# Patient Record
Sex: FEMALE | Race: WHITE | Employment: UNEMPLOYED | URBAN - METROPOLITAN AREA
[De-identification: names, ages, dates, MRNs, and addresses within clinical notes are randomized per-mention and may not be internally consistent; named-entity substitution may affect disease eponyms.]

---

## 2021-09-27 ENCOUNTER — TRANSCRIBE ORDER (OUTPATIENT)
Dept: SCHEDULING | Age: 36
End: 2021-09-27

## 2021-09-27 DIAGNOSIS — H53.9 UNSPECIFIED VISUAL DISTURBANCE: ICD-10-CM

## 2021-09-27 DIAGNOSIS — R47.01 APHASIA: ICD-10-CM

## 2021-09-27 DIAGNOSIS — R51.9 HA (HEADACHE): Primary | ICD-10-CM

## 2021-10-11 ENCOUNTER — TRANSCRIBE ORDER (OUTPATIENT)
Dept: SCHEDULING | Age: 36
End: 2021-10-11

## 2021-10-11 DIAGNOSIS — R00.2 PALPITATIONS: Primary | ICD-10-CM

## 2021-10-12 ENCOUNTER — HOSPITAL ENCOUNTER (OUTPATIENT)
Dept: CT IMAGING | Age: 36
Discharge: HOME | End: 2021-10-12
Payer: MEDICAID

## 2021-10-12 DIAGNOSIS — H53.9 UNSPECIFIED VISUAL DISTURBANCE: ICD-10-CM

## 2021-10-12 DIAGNOSIS — R47.01 APHASIA: ICD-10-CM

## 2021-10-12 DIAGNOSIS — R51.9 HA (HEADACHE): ICD-10-CM

## 2021-10-12 PROCEDURE — 70450 CT HEAD/BRAIN W/O DYE: CPT

## 2021-10-22 ENCOUNTER — HOSPITAL ENCOUNTER (OUTPATIENT)
Dept: VASCULAR SURGERY | Age: 36
Discharge: HOME | End: 2021-10-22
Attending: NURSE PRACTITIONER
Payer: MEDICAID

## 2021-10-22 DIAGNOSIS — R00.2 PALPITATIONS: ICD-10-CM

## 2021-10-22 LAB
ECHO AO ROOT DIAM: 2.58 CM
ECHO AV MEAN GRADIENT: 2.11 MMHG
ECHO AV MEAN VELOCITY: 67.82 CM/S
ECHO AV PEAK GRADIENT: 4.09 MMHG
ECHO AV PEAK VELOCITY: 101.08 CM/S
ECHO AV VTI: 21.56 CM
ECHO LA VOL 2C: 13.94 ML (ref 22–52)
ECHO LA VOL 4C: 21.48 ML (ref 22–52)
ECHO LA VOL BP: 19.45 ML (ref 22–52)
ECHO LA VOL BP: 19.45 ML (ref 22–52)
ECHO LV EDV A2C: 77.32 ML
ECHO LV EDV A2C: 77.32 ML
ECHO LV EDV BP: 35.08 ML (ref 56–104)
ECHO LV EDV BP: 35.08 ML (ref 56–104)
ECHO LV EJECTION FRACTION A2C: 63 PERCENT
ECHO LV EJECTION FRACTION A2C: 63 PERCENT
ECHO LV EJECTION FRACTION A4C: 59 PERCENT
ECHO LV EJECTION FRACTION A4C: 59 PERCENT
ECHO LV EJECTION FRACTION BIPLANE: 56.7 PERCENT (ref 55–100)
ECHO LV EJECTION FRACTION BIPLANE: 56.7 PERCENT (ref 55–100)
ECHO LV ESV A2C: 12.05 ML
ECHO LV ESV BP: 15.18 ML (ref 19–49)
ECHO LV ESV BP: 15.18 ML (ref 19–49)
ECHO LV INTERNAL DIMENSION DIASTOLIC: 4.17 CM (ref 3.9–5.3)
ECHO LV INTERNAL DIMENSION SYSTOLIC: 2.82 CM
ECHO LV IVSD: 0.67 CM (ref 0.6–0.9)
ECHO LV MASS 2D: 87.2 G (ref 67–162)
ECHO LV POSTERIOR WALL DIASTOLIC: 0.77 CM (ref 0.6–0.9)
ECHO LVOT PEAK GRADIENT: 2.78 MMHG
ECHO LVOT PEAK VELOCITY: 83.43 CM/S
ECHO LVOT SV: 50.1 ML
ECHO LVOT SV: 50.1 ML
ECHO LVOT VTI: 15.98 CM
ECHO MV A VELOCITY: 70.75 CM/S
ECHO MV AREA PHT: 3.26 CM2
ECHO MV AREA PHT: 3.26 CM2
ECHO MV E DECELERATION TIME (DT): 232.67 MS
ECHO MV E VELOCITY: 104.5 CM/S
ECHO MV E/A RATIO: 1.48
ECHO MV PRESSURE HALF TIME (PHT): 67.47 MS
LVOT MG: 1.28 MMHG

## 2021-10-22 PROCEDURE — 96374 THER/PROPH/DIAG INJ IV PUSH: CPT

## 2023-05-21 RX ORDER — CYCLOBENZAPRINE HCL 10 MG
10 TABLET ORAL 3 TIMES DAILY PRN
COMMUNITY
Start: 2014-09-16

## 2023-05-21 RX ORDER — NAPROXEN 375 MG/1
375 TABLET ORAL 2 TIMES DAILY WITH MEALS
COMMUNITY
Start: 2014-09-16

## 2024-07-29 ENCOUNTER — HOSPITAL ENCOUNTER (EMERGENCY)
Facility: HOSPITAL | Age: 39
Discharge: HOME | End: 2024-07-29
Attending: EMERGENCY MEDICINE
Payer: MEDICAID

## 2024-07-29 VITALS
TEMPERATURE: 97.4 F | HEIGHT: 61 IN | DIASTOLIC BLOOD PRESSURE: 78 MMHG | RESPIRATION RATE: 18 BRPM | OXYGEN SATURATION: 97 % | WEIGHT: 158 LBS | HEART RATE: 99 BPM | BODY MASS INDEX: 29.83 KG/M2 | SYSTOLIC BLOOD PRESSURE: 127 MMHG

## 2024-07-29 DIAGNOSIS — U07.1 COVID-19: Primary | ICD-10-CM

## 2024-07-29 LAB
FLUAV RNA SPEC QL NAA+PROBE: NOT DETECTED
FLUBV RNA SPEC QL NAA+PROBE: NOT DETECTED
SARS-COV-2 RNA RESP QL NAA+PROBE: DETECTED

## 2024-07-29 PROCEDURE — 99283 EMERGENCY DEPT VISIT LOW MDM: CPT

## 2024-07-29 PROCEDURE — 87636 SARSCOV2 & INF A&B AMP PRB: CPT

## 2024-07-29 RX ORDER — METOPROLOL SUCCINATE 25 MG/1
25 TABLET, EXTENDED RELEASE ORAL DAILY
COMMUNITY
Start: 2023-07-22

## 2024-07-29 RX ORDER — BUPRENORPHINE AND NALOXONE 8; 2 MG/1; MG/1
FILM, SOLUBLE BUCCAL; SUBLINGUAL
COMMUNITY
Start: 2023-09-12

## 2024-07-29 RX ORDER — SERTRALINE HYDROCHLORIDE 25 MG/1
25 TABLET, FILM COATED ORAL EVERY MORNING
COMMUNITY
Start: 2023-09-19

## 2024-07-29 RX ORDER — LAMOTRIGINE 25 MG/1
25 TABLET ORAL DAILY
COMMUNITY
Start: 2023-09-19

## 2024-07-29 RX ORDER — HYDROXYZINE PAMOATE 50 MG/1
50 CAPSULE ORAL 4 TIMES DAILY PRN
COMMUNITY
Start: 2023-07-18

## 2024-07-29 ASSESSMENT — PAIN - FUNCTIONAL ASSESSMENT
PAIN_FUNCTIONAL_ASSESSMENT: ACTIVITIES ARE NOT PREVENTED
PAIN_FUNCTIONAL_ASSESSMENT: 0-10

## 2024-07-29 ASSESSMENT — PAIN DESCRIPTION - LOCATION: LOCATION: GENERALIZED;HEAD

## 2024-07-29 ASSESSMENT — PAIN SCALES - GENERAL: PAINLEVEL_OUTOF10: 5

## 2024-07-29 NOTE — ED PROVIDER NOTES
[facility] EMERGENCY DEPT  EMERGENCY DEPARTMENT ENCOUNTER      Pt Name: Kayla Raymundo  MRN: [id number]  Birthdate 1985  Date of evaluation: 7/29/2024  Provider: Pierre Pimentel MD    CHIEF COMPLAINT       Chief Complaint   Patient presents with    Concern For COVID-19         HISTORY OF PRESENT ILLNESS   (Location/Symptom, Timing/Onset, Context/Setting, Quality, Duration, Modifying Factors, Severity)  Note limiting factors.   Kayla Raymundo is a 39 y.o. female who presents to the emergency department suspicion of COVID.  Family member at home with COVID patient is noted generalized symptoms of illness malaise minimal dry cough congestion.  No dyspnea no GI or  symptoms no underlying medical problems    HPI    Nursing Notes were reviewed.    REVIEW OF SYSTEMS    (2-9 systems for level 4, 10 or more for level 5)     Review of Systems   All other systems reviewed and are negative.      Except as noted above the remainder of the review of systems was reviewed and negative.       PAST MEDICAL HISTORY   History reviewed. No pertinent past medical history.      SURGICAL HISTORY     History reviewed. No pertinent surgical history.      CURRENT MEDICATIONS       Previous Medications    BUPRENORPHINE-NALOXONE (SUBOXONE) 8-2 MG FILM SL FILM    TAKE 1.5 VALARIE ONCE A DAY    CYCLOBENZAPRINE (FLEXERIL) 10 MG TABLET    Take 1 tablet by mouth 3 times daily as needed    HYDROXYZINE PAMOATE (VISTARIL) 50 MG CAPSULE    Take 1 capsule by mouth 4 times daily as needed    LAMOTRIGINE (LAMICTAL) 25 MG TABLET    Take 1 tablet by mouth daily    METOPROLOL SUCCINATE (TOPROL XL) 25 MG EXTENDED RELEASE TABLET    Take 1 tablet by mouth daily    NAPROXEN (NAPROSYN) 375 MG TABLET    Take 1 tablet by mouth 2 times daily (with meals)    SERTRALINE (ZOLOFT) 25 MG TABLET    Take 1 tablet by mouth every morning       ALLERGIES     Patient has no known allergies.    FAMILY HISTORY     History reviewed. No pertinent family history.       SOCIAL HISTORY

## 2025-04-04 ENCOUNTER — HOSPITAL ENCOUNTER (EMERGENCY)
Facility: HOSPITAL | Age: 40
Discharge: HOME | End: 2025-04-05
Payer: MEDICAID

## 2025-04-04 DIAGNOSIS — T50.904A OVERDOSE OF UNDETERMINED INTENT, INITIAL ENCOUNTER: Primary | ICD-10-CM

## 2025-04-04 LAB
ALBUMIN SERPL-MCNC: 3.9 G/DL (ref 3.5–5)
ALBUMIN/GLOB SERPL: 1.1 (ref 1.1–2.2)
ALP SERPL-CCNC: 77 U/L (ref 45–117)
ALT SERPL-CCNC: 37 U/L (ref 12–78)
AMPHET UR QL SCN: NEGATIVE
ANION GAP SERPL CALC-SCNC: 10 MMOL/L (ref 2–12)
APAP SERPL-MCNC: <2 UG/ML (ref 10–30)
APPEARANCE UR: CLEAR
AST SERPL W P-5'-P-CCNC: 31 U/L (ref 15–37)
BACTERIA URNS QL MICRO: ABNORMAL /HPF
BARBITURATES UR QL SCN: NEGATIVE
BASOPHILS # BLD: 0.05 K/UL (ref 0–0.1)
BASOPHILS NFR BLD: 0.4 % (ref 0–1)
BENZODIAZ UR QL: NEGATIVE
BILIRUB SERPL-MCNC: 0.6 MG/DL (ref 0.2–1)
BILIRUB UR QL: NEGATIVE
BUN SERPL-MCNC: 7 MG/DL (ref 6–20)
BUN/CREAT SERPL: 8 (ref 12–20)
CA-I BLD-MCNC: 9.3 MG/DL (ref 8.5–10.1)
CANNABINOIDS UR QL SCN: NEGATIVE
CHLORIDE SERPL-SCNC: 102 MMOL/L (ref 97–108)
CK SERPL-CCNC: 101 U/L (ref 26–192)
CO2 SERPL-SCNC: 26 MMOL/L (ref 21–32)
COCAINE UR QL SCN: NEGATIVE
COLOR UR: ABNORMAL
CREAT SERPL-MCNC: 0.85 MG/DL (ref 0.55–1.02)
DATE LAST DOSE: ABNORMAL
DATE LAST DOSE: NORMAL
DIFFERENTIAL METHOD BLD: ABNORMAL
DOSE AMOUNT: ABNORMAL UNITS
DOSE AMOUNT: NORMAL UNITS
EKG ATRIAL RATE: 85 BPM
EKG DIAGNOSIS: NORMAL
EKG P AXIS: 30 DEGREES
EKG P-R INTERVAL: 143 MS
EKG Q-T INTERVAL: 433 MS
EKG QRS DURATION: 99 MS
EKG QTC CALCULATION (BAZETT): 515 MS
EKG R AXIS: 10 DEGREES
EKG T AXIS: 43 DEGREES
EKG VENTRICULAR RATE: 85 BPM
EOSINOPHIL # BLD: 0.04 K/UL (ref 0–0.4)
EOSINOPHIL NFR BLD: 0.3 % (ref 0–7)
ERYTHROCYTE [DISTWIDTH] IN BLOOD BY AUTOMATED COUNT: 12.7 % (ref 11.5–14.5)
ETHANOL SERPL-MCNC: <10 MG/DL (ref 0–0.08)
GLOBULIN SER CALC-MCNC: 3.4 G/DL (ref 2–4)
GLUCOSE SERPL-MCNC: 116 MG/DL (ref 65–100)
GLUCOSE UR STRIP.AUTO-MCNC: NEGATIVE MG/DL
HCG UR QL: NEGATIVE
HCT VFR BLD AUTO: 42.4 % (ref 35–47)
HGB BLD-MCNC: 14.9 G/DL (ref 11.5–16)
HGB UR QL STRIP: ABNORMAL
IMM GRANULOCYTES # BLD AUTO: 0.06 K/UL (ref 0–0.04)
IMM GRANULOCYTES NFR BLD AUTO: 0.5 % (ref 0–0.5)
KETONES UR QL STRIP.AUTO: 15 MG/DL
LEUKOCYTE ESTERASE UR QL STRIP.AUTO: NEGATIVE
LYMPHOCYTES # BLD: 1.65 K/UL (ref 0.8–3.5)
LYMPHOCYTES NFR BLD: 13.1 % (ref 12–49)
Lab: NORMAL
MAGNESIUM SERPL-MCNC: 2 MG/DL (ref 1.6–2.4)
MCH RBC QN AUTO: 31.9 PG (ref 26–34)
MCHC RBC AUTO-ENTMCNC: 35.1 G/DL (ref 30–36.5)
MCV RBC AUTO: 90.8 FL (ref 80–99)
MDMA, URINE: NEGATIVE
METHADONE UR QL: NEGATIVE
MONOCYTES # BLD: 0.86 K/UL (ref 0–1)
MONOCYTES NFR BLD: 6.8 % (ref 5–13)
NEUTS SEG # BLD: 9.91 K/UL (ref 1.8–8)
NEUTS SEG NFR BLD: 78.9 % (ref 32–75)
NITRITE UR QL STRIP.AUTO: NEGATIVE
NRBC # BLD: 0 K/UL (ref 0–0.01)
NRBC BLD-RTO: 0 PER 100 WBC
OPIATES UR QL: NEGATIVE
PCP UR QL: NEGATIVE
PH UR STRIP: 6 (ref 5–8)
PLATELET # BLD AUTO: 372 K/UL (ref 150–400)
PMV BLD AUTO: 9.6 FL (ref 8.9–12.9)
POTASSIUM SERPL-SCNC: 3.6 MMOL/L (ref 3.5–5.1)
PROT SERPL-MCNC: 7.3 G/DL (ref 6.4–8.2)
PROT UR STRIP-MCNC: NEGATIVE MG/DL
RBC # BLD AUTO: 4.67 M/UL (ref 3.8–5.2)
RBC #/AREA URNS HPF: ABNORMAL /HPF (ref 0–3)
SALICYLATES SERPL-MCNC: 4.4 MG/DL (ref 2.8–20)
SODIUM SERPL-SCNC: 138 MMOL/L (ref 136–145)
SP GR UR REFRACTOMETRY: 1.01 (ref 1–1.03)
URINE CULTURE IF INDICATED: ABNORMAL
UROBILINOGEN UR QL STRIP.AUTO: 0.2 EU/DL (ref 0.2–1)
WBC # BLD AUTO: 12.6 K/UL (ref 3.6–11)
WBC URNS QL MICRO: ABNORMAL /HPF (ref 0–5)

## 2025-04-04 PROCEDURE — 81001 URINALYSIS AUTO W/SCOPE: CPT

## 2025-04-04 PROCEDURE — 96365 THER/PROPH/DIAG IV INF INIT: CPT

## 2025-04-04 PROCEDURE — 85025 COMPLETE CBC W/AUTO DIFF WBC: CPT

## 2025-04-04 PROCEDURE — 80179 DRUG ASSAY SALICYLATE: CPT

## 2025-04-04 PROCEDURE — 80143 DRUG ASSAY ACETAMINOPHEN: CPT

## 2025-04-04 PROCEDURE — 96368 THER/DIAG CONCURRENT INF: CPT

## 2025-04-04 PROCEDURE — 36415 COLL VENOUS BLD VENIPUNCTURE: CPT

## 2025-04-04 PROCEDURE — 99284 EMERGENCY DEPT VISIT MOD MDM: CPT

## 2025-04-04 PROCEDURE — 83735 ASSAY OF MAGNESIUM: CPT

## 2025-04-04 PROCEDURE — 80307 DRUG TEST PRSMV CHEM ANLYZR: CPT

## 2025-04-04 PROCEDURE — 80053 COMPREHEN METABOLIC PANEL: CPT

## 2025-04-04 PROCEDURE — 96375 TX/PRO/DX INJ NEW DRUG ADDON: CPT

## 2025-04-04 PROCEDURE — 6360000002 HC RX W HCPCS

## 2025-04-04 PROCEDURE — 96366 THER/PROPH/DIAG IV INF ADDON: CPT

## 2025-04-04 PROCEDURE — 82550 ASSAY OF CK (CPK): CPT

## 2025-04-04 PROCEDURE — 6370000000 HC RX 637 (ALT 250 FOR IP)

## 2025-04-04 PROCEDURE — 81025 URINE PREGNANCY TEST: CPT

## 2025-04-04 PROCEDURE — 2580000003 HC RX 258

## 2025-04-04 PROCEDURE — 82077 ASSAY SPEC XCP UR&BREATH IA: CPT

## 2025-04-04 RX ORDER — ACETAMINOPHEN 325 MG/1
650 TABLET ORAL
Status: COMPLETED | OUTPATIENT
Start: 2025-04-04 | End: 2025-04-04

## 2025-04-04 RX ORDER — MAGNESIUM SULFATE 1 G/100ML
1000 INJECTION INTRAVENOUS
Status: COMPLETED | OUTPATIENT
Start: 2025-04-04 | End: 2025-04-05

## 2025-04-04 RX ORDER — CALCIUM GLUCONATE 20 MG/ML
1000 INJECTION, SOLUTION INTRAVENOUS
Status: COMPLETED | OUTPATIENT
Start: 2025-04-04 | End: 2025-04-05

## 2025-04-04 RX ORDER — LORAZEPAM 2 MG/ML
1 INJECTION INTRAMUSCULAR ONCE
Status: COMPLETED | OUTPATIENT
Start: 2025-04-04 | End: 2025-04-04

## 2025-04-04 RX ORDER — 0.9 % SODIUM CHLORIDE 0.9 %
1000 INTRAVENOUS SOLUTION INTRAVENOUS ONCE
Status: COMPLETED | OUTPATIENT
Start: 2025-04-04 | End: 2025-04-04

## 2025-04-04 RX ADMIN — POTASSIUM BICARBONATE 20 MEQ: 782 TABLET, EFFERVESCENT ORAL at 23:23

## 2025-04-04 RX ADMIN — CALCIUM GLUCONATE 1000 MG: 20 INJECTION, SOLUTION INTRAVENOUS at 23:39

## 2025-04-04 RX ADMIN — SODIUM CHLORIDE 1000 ML: 0.9 INJECTION, SOLUTION INTRAVENOUS at 21:23

## 2025-04-04 RX ADMIN — LORAZEPAM 1 MG: 2 INJECTION INTRAMUSCULAR; INTRAVENOUS at 21:22

## 2025-04-04 RX ADMIN — MAGNESIUM SULFATE HEPTAHYDRATE 1000 MG: 1 INJECTION, SOLUTION INTRAVENOUS at 23:33

## 2025-04-04 RX ADMIN — ACETAMINOPHEN 650 MG: 325 TABLET ORAL at 23:23

## 2025-04-04 ASSESSMENT — PAIN - FUNCTIONAL ASSESSMENT: PAIN_FUNCTIONAL_ASSESSMENT: NONE - DENIES PAIN

## 2025-04-04 ASSESSMENT — LIFESTYLE VARIABLES
HOW OFTEN DO YOU HAVE A DRINK CONTAINING ALCOHOL: MONTHLY OR LESS
HOW MANY STANDARD DRINKS CONTAINING ALCOHOL DO YOU HAVE ON A TYPICAL DAY: 1 OR 2

## 2025-04-05 VITALS
DIASTOLIC BLOOD PRESSURE: 70 MMHG | WEIGHT: 159 LBS | RESPIRATION RATE: 21 BRPM | TEMPERATURE: 98.7 F | BODY MASS INDEX: 30.02 KG/M2 | HEIGHT: 61 IN | HEART RATE: 72 BPM | SYSTOLIC BLOOD PRESSURE: 138 MMHG | OXYGEN SATURATION: 97 %

## 2025-04-05 LAB
DATE LAST DOSE: NORMAL
DOSE AMOUNT: NORMAL UNITS
EKG ATRIAL RATE: 70 BPM
EKG DIAGNOSIS: NORMAL
EKG P AXIS: 43 DEGREES
EKG P-R INTERVAL: 144 MS
EKG Q-T INTERVAL: 446 MS
EKG QRS DURATION: 97 MS
EKG QTC CALCULATION (BAZETT): 482 MS
EKG R AXIS: 18 DEGREES
EKG T AXIS: 50 DEGREES
EKG VENTRICULAR RATE: 70 BPM
SALICYLATES SERPL-MCNC: 3.4 MG/DL (ref 2.8–20)

## 2025-04-05 PROCEDURE — 93005 ELECTROCARDIOGRAM TRACING: CPT

## 2025-04-05 NOTE — ED NOTES
Update to Poison Control.  Recommendation is monitor 8 hours after ingestion, recheck ASA level, make sure Mg, Ca, and K level on high side of normal, and get Qtc normal.

## 2025-04-05 NOTE — DISCHARGE INSTRUCTIONS
Thank you for choosing our Emergency Department for your care.  It is our privilege to care for you in your time of need.  In the next several days, you may receive a survey via email or mailed to your home about your experience with our team.  We would greatly appreciate you taking a few minutes to complete the survey, as we use this information to learn what we have done well and what we could be doing better. Thank you for trusting us with your care!    Below you will find a list of your tests from today's visit.   Labs and Radiology Studies  Recent Results (from the past 12 hours)   EKG 12 Lead    Collection Time: 04/04/25  9:02 PM   Result Value Ref Range    Ventricular Rate 85 BPM    Atrial Rate 85 BPM    P-R Interval 143 ms    QRS Duration 99 ms    Q-T Interval 433 ms    QTc Calculation (Bazett) 515 ms    P Axis 30 degrees    R Axis 10 degrees    T Axis 43 degrees    Diagnosis       Sinus rhythm  RSR' in V1 or V2, right VCD or RVH  Prolonged QT interval     CBC with Auto Differential    Collection Time: 04/04/25  9:15 PM   Result Value Ref Range    WBC 12.6 (H) 3.6 - 11.0 K/uL    RBC 4.67 3.80 - 5.20 M/uL    Hemoglobin 14.9 11.5 - 16.0 g/dL    Hematocrit 42.4 35.0 - 47.0 %    MCV 90.8 80.0 - 99.0 FL    MCH 31.9 26.0 - 34.0 PG    MCHC 35.1 30.0 - 36.5 g/dL    RDW 12.7 11.5 - 14.5 %    Platelets 372 150 - 400 K/uL    MPV 9.6 8.9 - 12.9 FL    Nucleated RBCs 0.0 0.0  WBC    nRBC 0.00 0.00 - 0.01 K/uL    Neutrophils % 78.9 (H) 32.0 - 75.0 %    Lymphocytes % 13.1 12.0 - 49.0 %    Monocytes % 6.8 5.0 - 13.0 %    Eosinophils % 0.3 0.0 - 7.0 %    Basophils % 0.4 0.0 - 1.0 %    Immature Granulocytes % 0.5 0 - 0.5 %    Neutrophils Absolute 9.91 (H) 1.80 - 8.00 K/UL    Lymphocytes Absolute 1.65 0.80 - 3.50 K/UL    Monocytes Absolute 0.86 0.00 - 1.00 K/UL    Eosinophils Absolute 0.04 0.00 - 0.40 K/UL    Basophils Absolute 0.05 0.00 - 0.10 K/UL    Immature Granulocytes Absolute 0.06 (H) 0.00 - 0.04 K/UL     when you go to your follow-up appointment.     If you have any problem arranging a follow-up appointment, contact us!  If your symptoms become worse or you do not improve as expected, please return to us. We are available 24 hours a day.     If a prescription has been provided, please fill it as soon as possible to prevent a delay in treatment. If you have any questions or reservations about taking the medication due to side effects or interactions with other medications, please call your primary care provider or contact us directly.  Again, THANK YOU for choosing us to care for YOU!

## 2025-04-05 NOTE — ED NOTES
Patient states that she has been struggling with depression.  When asked why she took high amount of meds she stated that she just wanted to be able to go to sleep and had no intention to harm self.

## 2025-04-05 NOTE — ED PROVIDER NOTES
[facility] EMERGENCY DEPT  EMERGENCY DEPARTMENT HISTORY AND PHYSICAL EXAM      Date of evaluation: 4/4/2025  Patient Name: Kayla Raymundo  Birthdate 1985  MRN: [id number]  ED Provider: Christie Zuñiga MD   Note Started: 9:49 PM EDT 4/4/25    HISTORY OF PRESENT ILLNESS     Chief Complaint   Patient presents with    Drug Overdose       History Provided By: Patient, EMS     HPI: Kayla Raymundo is a 39 y.o. female with past medical history as reviewed below who presents with overdose.  Patient reports that she took a \"double dose\" of her lamotrigine, she states that her dose was recently increased from 100 mg to 150mg, states that she took 3 of the 150 mg tabs.  Also reports taking 2 tabs of her Zoloft (100 mg tablets) and 1 tablet of lurasidone (40mg).  Patient currently reporting tremors and double vision.  Did have some vomiting with EMS.  Patient states that she was not trying to kill herself, but states that she was dealing with some personal issues and feel very stressed and anxious, and \"just wanted the pain to go away.\"   PAST MEDICAL HISTORY   Past Medical History:  Past Medical History:   Diagnosis Date    Anxiety     Depression     Hypertension        Past Surgical History:  No past surgical history on file.    Family History:  No family history on file.    Social History:       Allergies:  No Known Allergies    PCP: Virginie Kohli APRN - NP    Current Meds:   Current Facility-Administered Medications   Medication Dose Route Frequency Provider Last Rate Last Admin    calcium gluconate 1,000 mg in sodium chloride 50 mL  1,000 mg IntraVENous NOW Christie Zuñiga MD 50 mL/hr at 04/04/25 2339 1,000 mg at 04/04/25 2339    magnesium sulfate 1000 mg in dextrose 5% 100 mL IVPB  1,000 mg IntraVENous NOW Christie Zuñiga  mL/hr at 04/04/25 2333 1,000 mg at 04/04/25 2333     Current Outpatient Medications   Medication Sig Dispense Refill    buprenorphine-naloxone (SUBOXONE) 8-2 MG  for discharge home. The signs, symptoms, diagnosis, and discharge instructions have been discussed, understanding conveyed, and agreed upon. The patient is to follow up as recommended or return to ER should their symptoms worsen.      PATIENT REFERRED TO:  No follow-up provider specified.      DISCHARGE MEDICATIONS:     Medication List        ASK your doctor about these medications      cyclobenzaprine 10 MG tablet  Commonly known as: FLEXERIL     hydrOXYzine pamoate 50 MG capsule  Commonly known as: VISTARIL     lamoTRIgine 25 MG tablet  Commonly known as: LAMICTAL     metoprolol succinate 25 MG extended release tablet  Commonly known as: TOPROL XL     naproxen 375 MG tablet  Commonly known as: NAPROSYN     sertraline 25 MG tablet  Commonly known as: ZOLOFT     Suboxone 8-2 MG Film SL film  Generic drug: buprenorphine-naloxone                DISCONTINUED MEDICATIONS:  Current Discharge Medication List          I am the Primary Clinician of Record. Christie Zuñiga MD (electronically signed)    (Please note that parts of this dictation were completed with voice recognition software. Quite often unanticipated grammatical, syntax, homophones, and other interpretive errors are inadvertently transcribed by the computer software. Please disregards these errors. Please excuse any errors that have escaped final proofreading.)     Christie Zuñiga MD  04/05/25 0899

## 2025-04-05 NOTE — ED NOTES
Patient assisted to standing position and stumbled when upright.  Patient assisted back to bed stating that she felt dizzy.  MD at bedside.

## 2025-04-05 NOTE — ED TRIAGE NOTES
Patient brought in by [facility].  Patient states she took a double dose of lamotrigine equal to 450 mg 1 hour PTA.  EMS states patient vomited before arrival so patient believes they came up.  Patient c/o N/V and double vision.  Patient tearful and states that she has issues with addiction and has suboxone.  Patient denies SI/HI.

## 2025-04-05 NOTE — ED NOTES
Reported incident to poison control.  Their recommendations were to obtain EKG, CK, Mg, CMP, tylenol/salicylate level, ETOH, and UDS.  Also, treat symptoms with benzos.  MD made aware.
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
## 2025-04-05 NOTE — BSMART NOTE
BSMART assessment completed, and suicide risk level noted to be no risk. Primary Nurse Cristiana and Charge Nurse Kayla  and Physician Zuñiga notified. Concerns not observed.

## 2025-04-08 LAB
EKG ATRIAL RATE: 70 BPM
EKG ATRIAL RATE: 85 BPM
EKG DIAGNOSIS: NORMAL
EKG DIAGNOSIS: NORMAL
EKG P AXIS: 30 DEGREES
EKG P AXIS: 43 DEGREES
EKG P-R INTERVAL: 143 MS
EKG P-R INTERVAL: 144 MS
EKG Q-T INTERVAL: 433 MS
EKG Q-T INTERVAL: 446 MS
EKG QRS DURATION: 97 MS
EKG QRS DURATION: 99 MS
EKG QTC CALCULATION (BAZETT): 482 MS
EKG QTC CALCULATION (BAZETT): 515 MS
EKG R AXIS: 10 DEGREES
EKG R AXIS: 18 DEGREES
EKG T AXIS: 43 DEGREES
EKG T AXIS: 50 DEGREES
EKG VENTRICULAR RATE: 70 BPM
EKG VENTRICULAR RATE: 85 BPM